# Patient Record
Sex: FEMALE | Race: WHITE | URBAN - METROPOLITAN AREA
[De-identification: names, ages, dates, MRNs, and addresses within clinical notes are randomized per-mention and may not be internally consistent; named-entity substitution may affect disease eponyms.]

---

## 2019-07-01 ENCOUNTER — HISTORICAL (OUTPATIENT)
Dept: ADMINISTRATIVE | Facility: HOSPITAL | Age: 59
End: 2019-07-01

## 2019-07-02 ENCOUNTER — HISTORICAL (OUTPATIENT)
Dept: ADMINISTRATIVE | Facility: HOSPITAL | Age: 59
End: 2019-07-02

## 2019-08-14 ENCOUNTER — HISTORICAL (OUTPATIENT)
Dept: ADMINISTRATIVE | Facility: HOSPITAL | Age: 59
End: 2019-08-14

## 2019-09-25 ENCOUNTER — HISTORICAL (OUTPATIENT)
Dept: ADMINISTRATIVE | Facility: HOSPITAL | Age: 59
End: 2019-09-25

## 2022-04-07 ENCOUNTER — HISTORICAL (OUTPATIENT)
Dept: ADMINISTRATIVE | Facility: HOSPITAL | Age: 62
End: 2022-04-07

## 2022-04-23 VITALS
HEIGHT: 68 IN | SYSTOLIC BLOOD PRESSURE: 145 MMHG | DIASTOLIC BLOOD PRESSURE: 83 MMHG | BODY MASS INDEX: 25.73 KG/M2 | WEIGHT: 169.75 LBS

## 2022-04-30 NOTE — OP NOTE
DATE OF SURGERY:    07/02/2019    SURGEON:  Mark Ng MD    PREOPERATIVE DIAGNOSIS:  Left intra-articular olecranon fracture.    POSTOPERATIVE DIAGNOSIS:  Left intra-articular olecranon fracture.    PROCEDURE:  Open reduction and internal fixation, left intra-articular olecranon fracture.    ANESTHESIA:  General.    ESTIMATED BLOOD LOSS:  50 cc.    TOURNIQUET TIME:  60 minutes.    IMPLANTS:    1. Garrison VariAx olecranon 4 hole plate.  2. Milly VariAx mini-frag 7 hole 2.4 mm plate.    COMPLICATIONS:  None.    COUNTS:  All counts correct x2 at the end of the case.    INDICATIONS FOR PROCEDURE:  Ms. Vital is a 59-year-old female who had a fall onto her left elbow.  She sustained a comminuted fracture of her left olecranon.  She was seen and evaluated in the clinic.  The risks and benefits of treatment were discussed at length with the patient.  She is elected to undergo open reduction and internal fixation of her left olecranon fracture.    PROCEDURE IN DETAIL:  After informed consent was obtained, the patient was met in the preoperative holding area.  Her site was marked.  She was taken to the operating room.  She was placed supine on the operating table.  General anesthesia was induced.  She was then turned into the right lateral decubitus position.  The left upper extremity was prepped and draped in a standard sterile fashion after she was stabilized on a bean bag with an axillary roll.  Time-out was done to indicate the correct operative limb and procedure.  The limb was exsanguinated.  Tourniquet was raised.  A posterior approach to the elbow was performed.  The fracture site was identified she had multiple comminuted segments.  She had a large piece of the joint proximally with attachments to the triceps and a large portion of the cortex along the ulnar border that was free floating.  The pieces were cleaned.  Her joint surface was restored with clamps, held provisionally with K-wires.  The  large portion of the ulnar-sided cortex was then fitted back into position, helping to give a good read with the proximal and distal segments.  It was then fixated with two 2.4 mm independent screws.  A 2.4 mm mini fragment plate was then placed along the ulnar border of the proximal ulna.  Two proximal screws and 2 distal locking screws were used for provisional fixation and a posterior plate was then applied.  Its position was confirmed to be appropriate on AP and lateral imaging.  Screws were placed into the shaft and proximally sequentially tightened.  Nonlocking screws were used to bring the plate down to bone, compress across the fracture site.  Multiple locking screws were then placed into the proximal segment, and two more shaft screws were placed distally.  She was put through a range of motion.  She was found to have full range of motion with no impingement.  Final fluoroscopic images showed all the screws to be in appropriate position.  The wounds were irrigated.  Tourniquet was released.  Hemostasis was obtained.  The wound was irrigated and closed with a #1 Vicryl for repair of her triceps attachment as well as for deep closure 2-0 Vicryl and staples, Xeroform, 4x4s, cast padding, ABD, Ace bandage, and a posterior splint were applied at 90 degrees.  She was awakened and extubated after being laid supine and taken to recovery in stable condition.    POSTOPERATIVE PLAN:  She will be discharged home today nonweightbearing to the left upper extremity.  She will keep her splint clean and dry for a week, and she will have it removed and begin daily dressing changes and gentle range of motion exercises and she will follow up in 2 weeks for removal of her staples.        ______________________________  Mark Ng MD    [facility]  DD:  07/02/2019  Time:  10:26AM  DT:  07/02/2019  Time:  10:44AM  Job #:  [id number]

## 2022-05-02 NOTE — HISTORICAL [facility] C[facility]ER
This is a historical note converted from Cerned. Formatting and pictures may have been removed.  Please reference Cerned for original formatting and attached multimedia. Chief Complaint  6 WEEK F/U ORIF LEFT OLECRANON FX,NO COMPLAINTS  History of Present Illness  Patient is?6 weeks s/p ORIF left olecranon fracture. Here today for x-rays and evaluation.?Doing well overall.  Review of Systems  Constitutional: negative except as stated in HPI  Eye: negative except as stated in HPI  ENMT: negative except as stated in HPI  Respiratory: negative except as stated in HPI  Cardiovascular: negative except as stated in HPI  Gastrointestinal: negative except as stated in HPI  Genitourinary: negative except as stated in HPI  Hema/Lymph: negative except as stated in HPI  Endocrine: negative except as stated in HPI  Immunologic: negative except as stated in HPI  Musculoskeletal: negative except as stated in HPI  Integumentary: negative except as stated in HPI  Neurologic: negative except as stated in HPI  ?   All Other ROS_ ?negative except as stated in HPI  Physical Exam  Vitals & Measurements  T:?37? ?C (Oral)? HR:?96(Peripheral)? RR:?20? BP:?150/88?  HT:?172?cm? WT:?77?kg? BMI:?26.03?  General-Alert, oriented, no fever, chills  Musculoskeletal-LUE-surgical incision well healed. No prominent or painful hardware palpated. ?She is able to?flex elbow to about 115 degrees and lack about 25 degrees of extension. Full supination/pronation. NVID. BCR all digits. RP 2+  ?Neurologic-Alert and oriented x4, cooperative  ?Dermatologic-Skin warm, pink, dry.  Assessment/Plan  1.?Closed fracture of left olecranon process?S52.032D  Ordered:  Clinic Follow up, *Est. 09/25/19 3:00:00 CDT, Order for future visit, Closed fracture of left olecranon process, Orthopaedics  Post-Op follow-up visit [id number] PC, Closed fracture of left olecranon process, [facility], 08/14/19 14:19:00 CDT  PT/OT External Referral, 08/14/19 14:00:00 CDT, Closed  fracture of left olecranon process, Evaluate and Treat, 3 X Week, Patient has IV, Standard Precautions, No pushing, pulling or lifting greater than 5 pounds LUE. Aggressive ROM left elbow. Strengthening and stretcing LUE....  ?  ?  Patient doing well. No pushing, pulling or lifting greater than 5 pounds. Aggressive ROM of left elbow. Therapy orders provided. She will RTC in 6 weeks for repeat x-rays and evaluation. Patient agrees with treatment plan and all questions and concerns were addressed.  Referrals  Clinic Follow up, *Est. 09/25/19 3:00:00 CDT, Order for future visit, Closed fracture of left olecranon process, [facility]  PT/OT External Referral, 08/14/19 14:00:00 CDT, Closed fracture of left olecranon process, Evaluate and Treat, 3 X Week, Patient has IV, Standard Precautions, No pushing, pulling or lifting greater than 5 pounds LUE. Aggressive ROM left elbow. Strengthening and stretcing LUE....   Problem List/Past Medical History  Ongoing  Closed fracture of left olecranon process  Historical  Asthma  Congenital von Willebrand disease  Procedure/Surgical History  Open treatment of ulnar fracture, proximal end (eg, olecranon or coronoid process[es]), includes internal fixation, when performed (07/02/2019)  ORIF Elbow (Left) (07/02/2019)  Reposition Left Ulna with Internal Fixation Device, Open Approach (07/02/2019)  BREAT AUGMENTATION  Hysterectomy  Tonsillectomy   Medications  acetaminophen-hydrocodone 325 mg-5 mg oral tablet, 1 tab(s), Oral, q6hr  DIAZepam 5 mg oral tablet, 5 mg= 1 tab(s), Oral, TID  traMADol 50 mg oral tablet, 50 mg= 1 tab(s), Oral, q4-6hr  Allergies  Nickel?(itching, Welts)  morphine?(anaphalaxis)  Social History  Abuse/Neglect  No, 07/17/2019  Alcohol  1-2 times per month, 10/31/2018  Employment/School  Employed, Work/School description:  for dentist., 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  Bleeding disorder: Father.  CAD (coronary artery disease)....: Father.  Hypertension.: Brother.  Primary malignant neoplasm of colon: Sister.  Health Maintenance  Health Maintenance  ???Pending?(in the next year)  ??? ??OverDue  ??? ? ? ?Diabetes Screening due??and every?  ??? ??Due?  ??? ? ? ?ADL Screening due??08/14/19??and every 1??year(s)  ??? ? ? ?Aspirin Therapy for CVD Prevention due??08/14/19??and every 1??year(s)  ??? ? ? ?Colorectal Screening due??08/14/19??and every?  ??? ? ? ?Influenza Vaccine due??08/14/19??and every?  ??? ? ? ?Lipid Screening due??08/14/19??and every?  ??? ? ? ?Tetanus Vaccine due??08/14/19??and every 10??year(s)  ??? ??Due In Future?  ??? ? ? ?Depression Screening not due until??11/21/19??and every 1??year(s)  ??? ? ? ?Alcohol Misuse Screening not due until??01/01/20??and every 1??year(s)  ??? ? ? ?Obesity Screening not due until??01/01/20??and every 1??year(s)  ??? ? ? ?Blood Pressure Screening not due until??08/13/20??and every 1??year(s)  ??? ? ? ?Body Mass Index Check not due until??08/13/20??and every 1??year(s)  ???Satisfied?(in the past 1 year)  ??? ??Satisfied?  ??? ? ? ?Alcohol Misuse Screening on??07/01/19.??Satisfied by Rakel Harper  ??? ? ? ?Blood Pressure Screening on??08/14/19.??Satisfied by Rakel Harper  ??? ? ? ?Body Mass Index Check on??08/14/19.??Satisfied by Rakel Harper  ??? ? ? ?Breast Cancer Screening on??10/04/18.??Satisfied by Sana Barbosa  ??? ? ? ?Depression Screening on??11/21/18.??Satisfied by Khalif Irizarry LPN  ??? ? ? ?Diabetes Screening on??07/02/19.??Satisfied by Lizzie Garzon  ??? ? ? ?Influenza Vaccine on??11/21/18.??Satisfied by Khalif Irizarry LPN  ??? ? ? ?Obesity Screening on??08/14/19.??Satisfied by Rakel Harper  ?  Diagnostic Results  Left elbow: X-ray shows acceptable alignment, intact hardware, evidence of interval consolidation noted      Patient evaluated and  discussed with Mahogany Moe NP. I agree with her assessment and plan of care with any exceptions or additions noted. Doing great. Work with PT on ROM L elbow. No lifting over 5 pounds. Follow up in 6 weeks for repeat films.
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
## 2022-05-02 NOTE — HISTORICAL [facility]
This is a historical note converted from Yi. Formatting and pictures may have been removed.  Please reference Yi for original formatting and attached multimedia. Chief Complaint  3 MONTH ORIF LT OLECRANON FX, NO COMPLAINTS  History of Present Illness  Ms. Vital is here today for follow up 3 months out from ORIF of the left olecranon. She states she is doing very well. She has no pain in the elbow and her ROM continues to progress with therapy. She complains of some continues soreness and  weakness in the left hand that is also improving with therapy. She has been compliant with her lifting restriction. She denies any new complaints today.  Review of Systems  otherwise negative  Physical Exam  Vitals & Measurements  T:?37? ?C (Oral)? HR:?80(Peripheral)? RR:?20? BP:?145/83?  HT:?172?cm? WT:?77?kg? BMI:?26.03?  General: Awake, alert, oriented. Patient in no acute distress. Well nourished and well perfused.  Musculoskeletal:?  left elbow: well healed surgical incision with no erythema, drainage, or wound breakdown; lacks 10 degrees of extension; flexion 140 degrees and comparable to contralateral side; pronation/supination full compared to contralateral side; no Tenderness to palpation or prominent hardware; radial pulse 2+; PIN/AIN/ulnar nerve moter intact; SLT intact; BCR distally; mild dependent swelling in the hand; full motion to the digits  Assessment/Plan  1.?Closed fracture of left olecranon process?S52.032D  Ordered:  Post-Op follow-up visit [id number] PC, Closed fracture of left olecranon process, [facility], 09/25/19 15:18:00 CDT  ?  Orders:  Clinic Follow-up PRN, 09/25/19 15:18:00 CDT, Future Order, [facility]  She has healed this olecranon fracture very nicely. She can continue physical therapy until she meets her goals and then move on to home exercise program. At this point she can use the arm as tolerated and is free of any restrictions. She will follow up with me on an as  needed basis. All questions and concerns were addressed today and she is in agreement with the plan of care.  ?  I, Nelda Yuan NP, have scribed this note in the presence of Dr. Mark Ng who has personally examined the patient today.?  Referrals  Clinic Follow-up PRN, 09/25/19 15:18:00 CDT, Future Order, [facility]   Problem List/Past Medical History  Ongoing  Closed fracture of left olecranon process  Historical  Asthma  Congenital von Willebrand disease  Procedure/Surgical History  Open treatment of ulnar fracture, proximal end (eg, olecranon or coronoid process[es]), includes internal fixation, when performed (07/02/2019)  ORIF Elbow (Left) (07/02/2019)  Reposition Left Ulna with Internal Fixation Device, Open Approach (07/02/2019)  BREAT AUGMENTATION  Hysterectomy  Tonsillectomy   Medications  No active medications  Allergies  Nickel?(itching, Welts)  morphine?(anaphalaxis)  Social History  Abuse/Neglect  No, 09/25/2019  Alcohol  1-2 times per month, 10/31/2018  Employment/School  Employed, Work/School description:  for dentist., 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  Bleeding disorder: Father.  CAD (coronary artery disease)....: Father.  Hypertension.: Brother.  Primary malignant neoplasm of colon: Sister.  Health Maintenance  Health Maintenance  ???Pending?(in the next year)  ??? ??OverDue  ??? ? ? ?Diabetes Screening due??and every?  ??? ??Due?  ??? ? ? ?ADL Screening due??09/25/19??and every 1??year(s)  ??? ? ? ?Aspirin Therapy for CVD Prevention due??09/25/19??and every 1??year(s)  ??? ? ? ?Colorectal Screening due??09/25/19??and every?  ??? ? ? ?Influenza Vaccine due??09/25/19??and every?  ??? ? ? ?Lipid Screening due??09/25/19??and every?  ??? ? ? ?Tetanus Vaccine due??09/25/19??and every 10??year(s)  ??? ??Due In Future?  ??? ? ? ?Depression Screening not due  until??11/21/19??and every 1??year(s)  ??? ? ? ?Alcohol Misuse Screening not due until??01/01/20??and every 1??year(s)  ??? ? ? ?Obesity Screening not due until??01/01/20??and every 1??year(s)  ??? ? ? ?Blood Pressure Screening not due until??09/24/20??and every 1??year(s)  ??? ? ? ?Body Mass Index Check not due until??09/24/20??and every 1??year(s)  ???Satisfied?(in the past 1 year)  ??? ??Satisfied?  ??? ? ? ?Alcohol Misuse Screening on??07/01/19.??Satisfied by Rakel Harper  ??? ? ? ?Blood Pressure Screening on??09/25/19.??Satisfied by Mireya Perales  ??? ? ? ?Body Mass Index Check on??09/25/19.??Satisfied by Mireya Perales  ??? ? ? ?Breast Cancer Screening on??10/04/18.  ??? ? ? ?Depression Screening on??11/21/18.??Satisfied by Khalif Irizarry LPN  ??? ? ? ?Diabetes Screening on??07/02/19.??Satisfied by Lizzie Garzon  ??? ? ? ?Influenza Vaccine on??11/21/18.??Satisfied by Khalif Irizarry LPN  ??? ? ? ?Obesity Screening on??09/25/19.??Satisfied by Mireya Perales  ?  Diagnostic Results  x-rays of the left elbow demonstrate appropriate alignment with no displacement compared to previous films; hardware intact with no signs of hardware failure; interval callous noted; well healed olecranon fracture      Patient evaluated and discussed with Nelda Yuan NP. I agree with her assessment and plan of care with any exceptions or additions noted.